# Patient Record
Sex: MALE | Race: WHITE
[De-identification: names, ages, dates, MRNs, and addresses within clinical notes are randomized per-mention and may not be internally consistent; named-entity substitution may affect disease eponyms.]

---

## 2017-04-10 ENCOUNTER — HOSPITAL ENCOUNTER (OUTPATIENT)
Dept: HOSPITAL 53 - M SMT | Age: 42
End: 2017-04-10
Attending: NURSE PRACTITIONER
Payer: COMMERCIAL

## 2017-04-10 DIAGNOSIS — N50.819: Primary | ICD-10-CM

## 2017-04-10 NOTE — REP
SCROTAL ULTRASOUND:

 

Real-time sonographic evaluation of the scrotum and contents performed.

Testicles are normal in size and echotexture, right testicle measuring 4.8 x 2.4

x 3.4 cm and left testicle 4.4 x 2.5 x 3.3 cm.  There are a few tiny scattered

calcifications in the testicles.  No testicular mass or torsion is seen.  There

is blood flow seen in each testicle with duplex Doppler evaluation, RI of the

right testicle 0.57, left testicle 0.54.  A couple of tiny cysts are seen in the

head of the left epididymis.  There are small hydroceles.

 

IMPRESSION:

 

No testicular mass or torsion.

 

 

Signed by

Anival Atwood MD 04/10/2017 04:37 P

## 2017-10-10 ENCOUNTER — HOSPITAL ENCOUNTER (OUTPATIENT)
Dept: HOSPITAL 53 - M SMT | Age: 42
End: 2017-10-10
Attending: INTERNAL MEDICINE

## 2017-10-10 DIAGNOSIS — M51.36: Primary | ICD-10-CM

## 2017-10-11 NOTE — REP
Clinical:  Pain and disability.

 

Technique:  AP, lateral, coned-down views of the lumbosacral spine.

 

Findings:

Mild chronic levoconvex scoliosis appreciated.  Lordosis remains stable on the

lateral projection.  Advanced degenerative disc osteophyte complex at the L4-5

level includes marginal osteophytes, endplate sclerosis and disc space narrowing

with hypertrophic facet changes.  Moderate multilevel degenerative changes noted

throughout the remainder of the lower thoracic and lumbosacral spine including

minimal endplate sclerosis/irregularity with disc space narrowing.  No acute

fracture / compression injury or subluxation.

 

Impression:

Focal advanced degenerative changes at the L4-5 level with moderate degenerative

changes noted at the remainder of the lumbar spine.

2.  No acute fracture / compression injury or subluxation.

 

 

Signed by

Hakeem Hopper MD 10/11/2017 12:55 A

## 2019-08-05 ENCOUNTER — HOSPITAL ENCOUNTER (OUTPATIENT)
Dept: HOSPITAL 53 - M OPP | Age: 44
Discharge: HOME | End: 2019-08-05
Attending: INTERNAL MEDICINE
Payer: COMMERCIAL

## 2019-08-05 VITALS — BODY MASS INDEX: 31.42 KG/M2 | WEIGHT: 258 LBS | HEIGHT: 76 IN

## 2019-08-05 VITALS — SYSTOLIC BLOOD PRESSURE: 125 MMHG | DIASTOLIC BLOOD PRESSURE: 92 MMHG

## 2019-08-05 DIAGNOSIS — R63.4: ICD-10-CM

## 2019-08-05 DIAGNOSIS — K29.70: ICD-10-CM

## 2019-08-05 DIAGNOSIS — K64.8: ICD-10-CM

## 2019-08-05 DIAGNOSIS — K22.8: ICD-10-CM

## 2019-08-05 DIAGNOSIS — R19.7: Primary | ICD-10-CM

## 2019-08-05 DIAGNOSIS — R13.10: ICD-10-CM

## 2019-08-05 DIAGNOSIS — K44.9: ICD-10-CM

## 2019-08-05 PROCEDURE — 88305 TISSUE EXAM BY PATHOLOGIST: CPT

## 2019-08-05 PROCEDURE — 43239 EGD BIOPSY SINGLE/MULTIPLE: CPT

## 2019-08-05 PROCEDURE — 45380 COLONOSCOPY AND BIOPSY: CPT

## 2019-08-05 NOTE — ROOR
________________________________________________________________________________

Patient Name: Tobi Noble             Procedure Date: 8/5/2019 1:13 PM

MRN: [id number]                          Account Number: [id number]

YOB: 1975               Age: 43

Room: [facility]                            Gender: Male

Note Status: Finalized                 

________________________________________________________________________________

 

Procedure:           Colonoscopy

Indications:         Chronic diarrhea, Weight loss

Providers:           Ivan Medina MD

Referring MD:        Rene NGUYEN MD

Requesting Provider: 

Medicines:           Monitored Anesthesia Care

Complications:       No immediate complications.

________________________________________________________________________________

Procedure:           Pre-Anesthesia Assessment:

                     - Prior to the procedure, a History and Physical was 

                     performed, and patient medications and allergies were 

                     reviewed. The patient is competent. The risks and 

                     benefits of the procedure and the sedation options and 

                     risks were discussed with the patient. All questions were 

                     answered and informed consent was obtained. Patient 

                     identification and proposed procedure were verified by 

                     the physician, the nurse and the anesthesiologist in the 

                     procedure room. Mental Status Examination: alert and 

                     oriented. Airway Examination: normal oropharyngeal airway 

                     and neck mobility. Respiratory Examination: clear to 

                     auscultation. CV Examination: normal. Prophylactic 

                     Antibiotics: The patient does not require prophylactic 

                     antibiotics. Prior Anticoagulants: The patient has taken 

                     no previous anticoagulant or antiplatelet agents. ASA 

                     Grade Assessment: II - A patient with mild systemic 

                     disease. After reviewing the risks and benefits, the 

                     patient was deemed in satisfactory condition to undergo 

                     the procedure. The anesthesia plan was to use monitored 

                     anesthesia care (MAC). Immediately prior to 

                     administration of medications, the patient was 

                     re-assessed for adequacy to receive sedatives. The heart 

                     rate, respiratory rate, oxygen saturations, blood 

                     pressure, adequacy of pulmonary ventilation, and response 

                     to care were monitored throughout the procedure. The 

                     physical status of the patient was re-assessed after the 

                     procedure.

                     The Colonoscope was introduced through the anus and 

                     advanced to the terminal ileum, with identification of 

                     the appendiceal orifice and IC valve. The colonoscopy was 

                     performed without difficulty. The patient tolerated the 

                     procedure well. The quality of the bowel preparation was 

                     fair. The terminal ileum, ileocecal valve, appendiceal 

                     orifice, and rectum were photographed. Scope insertion 

                     time was 4 minutes. Scope withdrawal time was 8 minutes. 

                     The total duration of the procedure was 12 minutes.

                                                                                

Findings:

     The perianal and digital rectal examinations were normal.

     The terminal ileum appeared normal.

     Normal mucosa was found in the entire colon. Biopsies for histology were 

     taken with a cold forceps from the right colon, left colon and 

     rectosigmoid colon for evaluation of microscopic colitis. Verification of 

     patient identification for the specimen was done by the physician and 

     nurse using the patient's name, birth date and medical record number. 

     Estimated blood loss was minimal.

     Non-bleeding external and internal hemorrhoids were found during 

     retroflexion. The hemorrhoids were medium-sized.

                                                                                

Impression:          - The examined portion of the ileum was normal.

                     - Normal mucosa in the entire examined colon. Biopsied.

                     - Non-bleeding external and internal hemorrhoids.

Recommendation:      - Patient has a contact number available for emergencies. 

                     The signs and symptoms of potential delayed complications 

                     were discussed with the patient. Return to normal 

                     activities tomorrow. Written discharge instructions were 

                     provided to the patient.

                     - High fiber diet.

                     - Continue present medications.

                     - Await pathology results.

                     - Repeat colonoscopy in 5-10 years for screening purposes 

                     and because the bowel preparation was suboptimal.

                     - Return to GI clinic in [facility] 

                     (address [address], [address], [place], [facility]) 

                     in 4 -- 6 weeks. Please call GI clinic @ [phone] for 

                     apppointment date and time.

                     - Return to primary care physician.

                                                                                

 

Ivan Medina MD

_______________________

Ivan Medina MD

8/5/2019 2:31:38 PM

Electronically signed by Ivan Medina MD

Number of Addenda: 0

 

Note Initiated On: 8/5/2019 1:13 PM

Estimated Blood Loss:

     Estimated blood loss was minimal.

## 2019-08-05 NOTE — ROOR
________________________________________________________________________________

Patient Name: Tobi Noble             Procedure Date: 8/5/2019 1:12 PM

MRN: [id number]                          Account Number: [id number]

YOB: 1975               Age: 43

Room: [facility]                            Gender: Male

Note Status: Finalized                 

________________________________________________________________________________

 

Procedure:           Upper GI endoscopy

Indications:         Dysphagia, Weight loss

Providers:           Ivan Medina MD

Referring MD:        Rene NGUYEN MD

Requesting Provider: 

Medicines:           Monitored Anesthesia Care

Complications:       No immediate complications.

________________________________________________________________________________

Procedure:           Pre-Anesthesia Assessment:

                     - Prior to the procedure, a History and Physical was 

                     performed, and patient medications and allergies were 

                     reviewed. The patient is competent. The risks and 

                     benefits of the procedure and the sedation options and 

                     risks were discussed with the patient. All questions were 

                     answered and informed consent was obtained. Patient 

                     identification and proposed procedure were verified by 

                     the physician, the nurse and the anesthesiologist in the 

                     procedure room. Mental Status Examination: alert and 

                     oriented. Airway Examination: normal oropharyngeal airway 

                     and neck mobility. Respiratory Examination: clear to 

                     auscultation. CV Examination: normal. Prophylactic 

                     Antibiotics: The patient does not require prophylactic 

                     antibiotics. Prior Anticoagulants: The patient has taken 

                     no previous anticoagulant or antiplatelet agents. ASA 

                     Grade Assessment: II - A patient with mild systemic 

                     disease. After reviewing the risks and benefits, the 

                     patient was deemed in satisfactory condition to undergo 

                     the procedure. The anesthesia plan was to use monitored 

                     anesthesia care (MAC). Immediately prior to 

                     administration of medications, the patient was 

                     re-assessed for adequacy to receive sedatives. The heart 

                     rate, respiratory rate, oxygen saturations, blood 

                     pressure, adequacy of pulmonary ventilation, and response 

                     to care were monitored throughout the procedure. The 

                     physical status of the patient was re-assessed after the 

                     procedure.

                     The Endoscope was introduced through the mouth, and 

                     advanced to the second part of duodenum. The upper GI 

                     endoscopy was accomplished without difficulty. The 

                     patient tolerated the procedure well.

                                                                                

Findings:

     The Z-line was irregular and was found 38 cm from the incisors.

     Normal mucosa was found in the entire esophagus. Two biopsies were 

     obtained in the middle third of the esophagus with cold forceps for 

     evaluation of eosinophilic esophagitis. Verification of patient 

     identification for the specimen was done by the physician and nurse using 

     the patient's name, birth date and medical record number. Estimated blood 

     loss was minimal.

     A small hiatal hernia was present.

     Localized mild inflammation characterized by congestion (edema), 

     erythema, granularity and shallow ulcerations was found in the gastric 

     antrum. Four biopsies were obtained with cold forceps for histology from 

     gastric ulcer in pre-pylori area, as well as two biopsies in the gastric 

     antrum.

     The duodenal bulb and second portion of the duodenum were normal. 

     Biopsies for histology were taken with a cold forceps for evaluation of 

     celiac disease.

                                                                                

Impression:          - Z-line irregular, 38 cm from the incisors.

                     - Normal mucosa was found in the entire esophagus.

                     - Small hiatal hernia.

                     - Gastritis.

                     - Normal duodenal bulb and second portion of the 

                     duodenum. Biopsied.

                     - Two biopsies were obtained in the middle third of the 

                     esophagus.

                     - Biopsies performed from gastric ulcer in pre-pylori 

                     area and in the gastric antrum.

Recommendation:      - Patient has a contact number available for emergencies. 

                     The signs and symptoms of potential delayed complications 

                     were discussed with the patient. Return to normal 

                     activities tomorrow. Written discharge instructions were 

                     provided to the patient.

                     - Resume previous diet.

                     - Continue present medications.

                     - Use Protonix (pantoprazole) 40 mg PO daily - to be 

                     taken early morning 1/2 hour before breakfast for 8 weeks.

                     - Await pathology results.

                     - Return to GI clinic in [facility] 

                     (address [address], [address] [address]) 

                     in 4 -- 6 weeks. Please call GI clinic @ [phone] for 

                     apppointment date and time.

                     - Return to primary care physician.

                                                                                

 

Ivan Medina MD

_______________________

Ivan Medina MD

8/5/2019 2:28:24 PM

Electronically signed by Ivan Medina MD

Number of Addenda: 0

 

Note Initiated On: 8/5/2019 1:12 PM

Estimated Blood Loss:

     Estimated blood loss: none.

## 2020-03-24 ENCOUNTER — HOSPITAL ENCOUNTER (OUTPATIENT)
Dept: HOSPITAL 53 - M RAD | Age: 45
End: 2020-03-24
Attending: INTERNAL MEDICINE
Payer: COMMERCIAL

## 2020-03-24 DIAGNOSIS — R06.00: Primary | ICD-10-CM

## 2020-03-24 NOTE — REP
Clinical:  Dyspnea .

 

Comparison: None .

 

Technique:  PA and lateral.

 

Findings:

The mediastinum and cardiac silhouette are normal.  The lung fields are clear and

without acute consolidation, effusion, or pneumothorax.  The skeletal structures

are intact and normal.

 

Impression:

1.   No acute cardiopulmonary process.

 

 

Electronically Signed by

Hakeem Hopper MD 03/24/2020 11:12 A

## 2020-06-28 ENCOUNTER — HOSPITAL ENCOUNTER (OUTPATIENT)
Dept: HOSPITAL 53 - M SLEEP | Age: 45
End: 2020-06-28
Attending: PHYSICIAN ASSISTANT
Payer: COMMERCIAL

## 2020-06-28 DIAGNOSIS — G47.33: Primary | ICD-10-CM

## 2020-06-28 DIAGNOSIS — G47.31: ICD-10-CM

## 2020-07-03 NOTE — SLEEPCENT
DATE OF PROCEDURE:  06/28/2020

 

ORDERED BY:  Honorio Stuart PA-C

 

Nocturnal polysomnography was performed for evaluation of sleep physiology in

this patient with a history of excessive somnolence, snoring and nonrestorative

sleep who has comorbidities of hypertension and chronic back pain.

 

7 hours and 6 minutes of data were reviewed.  There were 392 minutes of sleep

identified.  Sleep latency was short at 7 minutes.  Rapid eye movement (REM)

latency was delayed at 181 minutes.  Sleep architecture showed poor progression

and some fragmentation was seen.  Overall sleep efficiency was 92.9%.  There was

only one REM cycle noted.  The patient's electrocardiogram showed a sinus rhythm

with an average heart rate of 56 beats per minute.  EEG showed some coarsening in

background.  No focal events were identified.  There were normal waveforms for

awake and sleep stages.  There were 67 respiratory events identified of 10

seconds in duration or greater for an apnea-hypopnea index of 10.3.  The events

were more frequently central than obstructive with 38 mixed and central events of

the 67 total.  The events were not exclusive to sleep stage nor body posture.

Arousals from respiratory events occurred 1.7 times per hour, oxygen saturations

remained into the 90s.  There was some limb activity.  Limb movement arousal

index was only 2.8.

 

IMPRESSION:

Complex obstructive sleep apnea syndrome (G47.33, G47.31).  Apnea-hypopnea index

10.3.

 

RECOMMENDATIONS:

The patient should be encouraged to return to the sleep disorder center for

pressure therapy.  Given the complex nature of the disease, a bilevel device and

backup rate may be necessary.  In the interim, alcohol and sedative avoidance

should be practiced and caution exercised during the operation of motor vehicles.

## 2021-01-06 ENCOUNTER — HOSPITAL ENCOUNTER (OUTPATIENT)
Dept: HOSPITAL 53 - M SLEEP | Age: 46
End: 2021-01-06
Attending: PHYSICIAN ASSISTANT
Payer: COMMERCIAL

## 2021-01-06 DIAGNOSIS — G47.33: Primary | ICD-10-CM

## 2021-01-07 NOTE — SLEEPCENT
DATE: 01/06/2021



ORDERED BY: Honorio Stuart



Nocturnal polysomnography was performed for the titration of pressure therapy

in this patient with obstructive sleep apnea syndrome, apnea-hypopnea index

10.3.



For testing, patient was fit with a ResMed AirFit F20 full-face mask of medium

size. There was 4 cm of water pressure applied to the circuit, and the lights

were extinguished. 



There was 8 hours and 11 minutes of data reviewed. There was 441.5 minutes of

sleep identified. Sleep latency was normal at 11.5 minutes. REM sleep was

delayed at 220 minutes. Sleep architecture improved as pressure was optimized.

Overall sleep efficiency was 90.8%. The electrocardiogram showed a sinus rhythm

with an average heart rate of 50 beats per minute. EEG showed normal waveforms

for wake and sleep. Respiratory events were well palliated with CPAP at a

pressure of +9. There was some activity in the limb leads, particularly early

in the study. Limb movement arousal index was 2.



IMPRESSION: 

Obstructive sleep apnea syndrome (G47.33).



RECOMMENDATION: Nightly use of pressure therapy, 9 cm of water.

## 2021-05-10 ENCOUNTER — HOSPITAL ENCOUNTER (OUTPATIENT)
Dept: HOSPITAL 53 - M SFHCPLAZ | Age: 46
End: 2021-05-10
Attending: FAMILY MEDICINE
Payer: COMMERCIAL

## 2021-05-10 DIAGNOSIS — Z53.20: Primary | ICD-10-CM

## 2021-06-08 ENCOUNTER — HOSPITAL ENCOUNTER (OUTPATIENT)
Dept: HOSPITAL 53 - M SFHCPLAZ | Age: 46
End: 2021-06-08
Attending: FAMILY MEDICINE
Payer: COMMERCIAL

## 2021-06-08 DIAGNOSIS — R79.1: Primary | ICD-10-CM

## 2021-06-08 LAB
APTT BLD: 64.7 SECONDS (ref 24.2–38.5)
INR PPP: 0.9
PROTHROMBIN TIME: 12.3 SECONDS (ref 12.5–14.3)